# Patient Record
Sex: FEMALE | Race: BLACK OR AFRICAN AMERICAN | NOT HISPANIC OR LATINO | URBAN - METROPOLITAN AREA
[De-identification: names, ages, dates, MRNs, and addresses within clinical notes are randomized per-mention and may not be internally consistent; named-entity substitution may affect disease eponyms.]

---

## 2020-01-23 ENCOUNTER — EMERGENCY (EMERGENCY)
Facility: HOSPITAL | Age: 25
LOS: 1 days | Discharge: HOME | End: 2020-01-23
Attending: EMERGENCY MEDICINE
Payer: MEDICAID

## 2020-01-23 VITALS
OXYGEN SATURATION: 98 % | TEMPERATURE: 98 F | HEART RATE: 87 BPM | RESPIRATION RATE: 17 BRPM | WEIGHT: 235.01 LBS | SYSTOLIC BLOOD PRESSURE: 120 MMHG | DIASTOLIC BLOOD PRESSURE: 84 MMHG | HEIGHT: 64 IN

## 2020-01-23 VITALS
RESPIRATION RATE: 16 BRPM | DIASTOLIC BLOOD PRESSURE: 86 MMHG | TEMPERATURE: 99 F | SYSTOLIC BLOOD PRESSURE: 122 MMHG | HEART RATE: 86 BPM | OXYGEN SATURATION: 99 %

## 2020-01-23 PROCEDURE — 93005 ELECTROCARDIOGRAM TRACING: CPT

## 2020-01-23 PROCEDURE — 99283 EMERGENCY DEPT VISIT LOW MDM: CPT | Mod: 25

## 2020-01-23 PROCEDURE — 99284 EMERGENCY DEPT VISIT MOD MDM: CPT

## 2020-01-23 PROCEDURE — 93010 ELECTROCARDIOGRAM REPORT: CPT

## 2020-01-23 PROCEDURE — 96372 THER/PROPH/DIAG INJ SC/IM: CPT

## 2020-01-23 RX ORDER — ACETAMINOPHEN 500 MG
975 TABLET ORAL ONCE
Refills: 0 | Status: COMPLETED | OUTPATIENT
Start: 2020-01-23 | End: 2020-01-23

## 2020-01-23 RX ORDER — DIPHENHYDRAMINE HCL 50 MG
25 CAPSULE ORAL ONCE
Refills: 0 | Status: DISCONTINUED | OUTPATIENT
Start: 2020-01-23 | End: 2020-01-23

## 2020-01-23 RX ORDER — DEXAMETHASONE 0.5 MG/5ML
8 ELIXIR ORAL ONCE
Refills: 0 | Status: COMPLETED | OUTPATIENT
Start: 2020-01-23 | End: 2020-01-23

## 2020-01-23 RX ORDER — METOCLOPRAMIDE HCL 10 MG
10 TABLET ORAL ONCE
Refills: 0 | Status: COMPLETED | OUTPATIENT
Start: 2020-01-23 | End: 2020-01-23

## 2020-01-23 RX ORDER — METOCLOPRAMIDE HCL 10 MG
10 TABLET ORAL ONCE
Refills: 0 | Status: DISCONTINUED | OUTPATIENT
Start: 2020-01-23 | End: 2020-01-23

## 2020-01-23 RX ORDER — KETOROLAC TROMETHAMINE 30 MG/ML
15 SYRINGE (ML) INJECTION ONCE
Refills: 0 | Status: DISCONTINUED | OUTPATIENT
Start: 2020-01-23 | End: 2020-01-23

## 2020-01-23 RX ORDER — IBUPROFEN 200 MG
600 TABLET ORAL ONCE
Refills: 0 | Status: COMPLETED | OUTPATIENT
Start: 2020-01-23 | End: 2020-01-23

## 2020-01-23 RX ADMIN — Medication 975 MILLIGRAM(S): at 14:41

## 2020-01-23 RX ADMIN — Medication 8 MILLIGRAM(S): at 14:40

## 2020-01-23 RX ADMIN — Medication 600 MILLIGRAM(S): at 14:41

## 2020-01-23 RX ADMIN — Medication 10 MILLIGRAM(S): at 14:41

## 2020-01-23 NOTE — ED PROVIDER NOTE - NSFOLLOWUPINSTRUCTIONS_ED_ALL_ED_FT
1. You were seen in the ED for a headache and elevated blood pressure. Your examination, ECG and repeat blood pressure was normal. Your headache is likely from an ongoing migraine headache.     2. Please call the number provided to arrange a follow up with our neurologists given how frequently you are having headaches.     3. You may use Tylenol 650mg every 8 hours or Motrin 600mg every 8 hours as needed for pain. These are over the counter medications.      4. Arrange a follow up with your regular doctor in 3-5 days.    5. Return to the ED for loss of vision, weakness on one side of body or for any other concerns.

## 2020-01-23 NOTE — ED PROVIDER NOTE - ATTENDING CONTRIBUTION TO CARE
Attending MD Stallings:  I personally have seen and examined this patient.  Resident note reviewed and agree on plan of care and except where noted.  See HPI, PE, and MDM for details.

## 2020-01-23 NOTE — ED ADULT TRIAGE NOTE - CHIEF COMPLAINT QUOTE
headache x 2 months - seen by neurologist, started on medication (Diclofenac) with minimal relief   Patient c/o elevated BP this AM while at work

## 2020-01-23 NOTE — ED PROVIDER NOTE - OBJECTIVE STATEMENT
PGY2/MD Samuel. 23 yo F, not obese lady, p/w acute on chronic headache, x 1wk. No difficulty walking/tingling. No vision change. No fever, chills.

## 2020-01-23 NOTE — ED PROVIDER NOTE - CARE PROVIDER_API CALL
Js Johnson (DO)  Neurology  [address], [address]  [place] [address]  Phone: [phone]  Fax: [phone]  Follow Up Time: 7-10 Days

## 2020-01-23 NOTE — ED PROVIDER NOTE - CLINICAL SUMMARY MEDICAL DECISION MAKING FREE TEXT BOX
Attending MD Stallings: 24F with no migraines presenting with daily headache x 1 month, headache is left temporal with associated phonophobia and phonophobia. Patient also had palpitations and elevated BP (150s systolic) prior to arrival. Nonfocal neuro exam, reportedly normal outpatient MRI performed with neurologist Attending MD Stallings: 24F with no migraines presenting with daily headache x 1 month, headache is left temporal with associated phonophobia and phonophobia. Patient also had palpitations and elevated BP (150s systolic) prior to arrival. Nonfocal neuro exam, reportedly normal outpatient MRI performed with neurologist, no indication for urgent neuro imaging in ED. Likely frequent migraine headache. Plan for abortive agents in ED, PO decadron to attempt to prevent recurrence. Discussed with patient that she likely warrants migraine prophylactic daily medication which she was on previously but self-discontinued. Patient would like referral to new neurologist which I will provide.

## 2020-01-23 NOTE — ED PROVIDER NOTE - PHYSICAL EXAMINATION
PGY2/MD Samuel.   VITALS: reviewed  GEN: No apparent distress, A & O x 4  HEAD/EYES: NC/AT, PERRL, EOMI, anicteric sclerae, no conjunctival pallor  ENT: mucus membranes moist, oropharynx WNL, trachea midline, no JVD, neck is supple  RESP: lungs CTA with equal breath sounds bilaterally, chest wall nontender and atraumatic  CV: heart with reg rhythm S1, S2, no murmur  ABDOMEN: normoactive bowel sounds, soft, nondistended, nontender  MSK: extremities atraumatic and nontender, no edema, no asymmetry.  SKIN: warm, dry, no rash, no bruising, no cyanosis. color appropriate for ethnicity  NEURO: alert, mentating appropriately, no facial asymmetry. gross sensation, motor, coordination are intact  PSYCH: Affect appropriate

## 2020-01-23 NOTE — ED ADULT NURSE NOTE - OBJECTIVE STATEMENT
25 yo female presents to the ED via waiting room from work complaining of a headache x 2 months. Patient states headache started 2 months ago, has been one sided on the left. Patient saw a neurologist, negative EEG and MRI. Was prescribed Diclofenac and stated it helped temporarily but is now getting no relief. Headaches are random, per patient, and has been taking Advil around the clock with little relief. Patient states when headaches occur she is dizzy and gets blurry vision. Headache episodes last until she takes medication for it. 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 Patient is unaware of baseline blood pressure. Endorses feeling nauseous with headache episodes. PMH of migraines and ovarian cysts. Denies chest pain, abdominal pain,  vomiting, diarrhea, fevers, chills, dysuria, hematuria, recent illness travel or fall.

## 2020-04-25 ENCOUNTER — MESSAGE (OUTPATIENT)
Age: 25
End: 2020-04-25

## 2025-02-18 NOTE — ED ADULT NURSE NOTE - NSFALLRSKASSESASSIST_ED_ALL_ED
Please consider purchasing [employer] Total Max Arch Support. This can be bought on [employer] or on [email or web address]. They range ~$40-$50 per pair.     no